# Patient Record
Sex: MALE | Race: WHITE | URBAN - NONMETROPOLITAN AREA
[De-identification: names, ages, dates, MRNs, and addresses within clinical notes are randomized per-mention and may not be internally consistent; named-entity substitution may affect disease eponyms.]

---

## 2019-09-12 ENCOUNTER — IMPORTED ENCOUNTER (OUTPATIENT)
Dept: URBAN - NONMETROPOLITAN AREA CLINIC 1 | Facility: CLINIC | Age: 49
End: 2019-09-12

## 2019-09-12 PROBLEM — H35.51: Noted: 2019-09-12

## 2019-09-12 PROBLEM — H25.043: Noted: 2019-09-12

## 2019-09-12 PROBLEM — H52.223: Noted: 2019-09-12

## 2019-09-12 PROBLEM — H52.4: Noted: 2019-09-12

## 2019-09-12 PROBLEM — H52.13: Noted: 2019-09-12

## 2019-09-12 PROCEDURE — 92004 COMPRE OPH EXAM NEW PT 1/>: CPT

## 2019-09-12 NOTE — PATIENT DISCUSSION
Cataract(s)-Visually significant.-Cataract(s) causing symptomatic impairment of visual function not correctable with a tolerable change in glasses or contact lenses lighting or non-operative means resulting in specific activity limitations and/or participation restrictions including but not limited to reading viewing television driving or meeting vocational or recreational needs. -Expectation is clearer vision and reduced glare disability after cataract removal.-Refer to Dr Kane Parra for cataract evaluationPT TO SEE [facility] REFERRALSTARHon[facility]DT'S DISEASESTABLE OU TODAY

## 2019-10-11 ENCOUNTER — IMPORTED ENCOUNTER (OUTPATIENT)
Dept: URBAN - NONMETROPOLITAN AREA CLINIC 1 | Facility: CLINIC | Age: 49
End: 2019-10-11

## 2019-10-11 PROCEDURE — 92014 COMPRE OPH EXAM EST PT 1/>: CPT

## 2019-10-11 NOTE — PATIENT DISCUSSION
Cataract(s)-Visually significant cataract OU .-Cataract(s) causing symptomatic impairment of visual function not correctable with a tolerable change in glasses or contact lenses lighting or non-operative means resulting in specific activity limitations and/or participation restrictions including but not limited to reading viewing television driving or meeting vocational or recreational needs. -Expectation is clearer vision and functional improvement in symptoms as well as reduced glare disability after cataract removal.-Order IOLMaster and OPD today. -Recommend standard/traditional based on today's OPD testing and lifestyle questionnaire.-All questions were answered regarding surgery including pre and post-op medications appointments activity restrictions and anesthetic usage.-The risks benefits and alternatives and special risk factors for the patient were discussed in detail including but not limited to: bleeding infection retinal detachment vitreous loss problems with the implant and possible need for additional surgery.-Although rare the possibility of complete vision loss was discussed.-The possible need for glasses post-operatively was discussed.-Order medical clearance exam based on history of cholesterol HTN. -Patient elects to proceed with cataract surgery OD . Will schedule at patient's convenience and re-evaluate OS  in the future. 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

## 2019-11-04 PROBLEM — H52.223: Noted: 2019-11-04

## 2019-11-04 PROBLEM — H52.13: Noted: 2019-11-04

## 2019-11-04 PROBLEM — H25.043: Noted: 2019-11-04

## 2019-11-04 PROBLEM — H35.51: Noted: 2019-11-04

## 2019-11-13 PROBLEM — I10: Noted: 2019-11-13

## 2019-11-13 PROBLEM — E78.5: Noted: 2019-11-13

## 2019-11-13 PROBLEM — H25.043: Noted: 2019-11-13

## 2019-11-13 PROBLEM — Z01.818: Noted: 2019-11-13

## 2019-11-14 ENCOUNTER — IMPORTED ENCOUNTER (OUTPATIENT)
Dept: URBAN - NONMETROPOLITAN AREA CLINIC 1 | Facility: CLINIC | Age: 49
End: 2019-11-14

## 2019-11-14 PROCEDURE — 99214 OFFICE O/P EST MOD 30 MIN: CPT

## 2019-12-12 ENCOUNTER — IMPORTED ENCOUNTER (OUTPATIENT)
Dept: URBAN - NONMETROPOLITAN AREA CLINIC 1 | Facility: CLINIC | Age: 49
End: 2019-12-12

## 2019-12-12 PROBLEM — Z98.41: Noted: 2019-12-12

## 2019-12-12 PROCEDURE — 92136 OPHTHALMIC BIOMETRY: CPT

## 2019-12-12 PROCEDURE — 66984 XCAPSL CTRC RMVL W/O ECP: CPT

## 2019-12-12 NOTE — PATIENT DISCUSSION
s/p PCIOL-Pt doing well s/p PCIOL. -Continue post-op gtts according to instruction sheet and sleep with eye shield over eye for 7 nights.-Avoid bending at the waist lifting anything over 5lbs and dirty or ariana environments. -RTC .

## 2019-12-18 ENCOUNTER — IMPORTED ENCOUNTER (OUTPATIENT)
Dept: URBAN - NONMETROPOLITAN AREA CLINIC 1 | Facility: CLINIC | Age: 49
End: 2019-12-18

## 2019-12-18 PROBLEM — Z98.41: Noted: 2019-12-12

## 2019-12-18 PROBLEM — Z01.818: Noted: 2019-12-18

## 2019-12-18 PROBLEM — H25.812: Noted: 2019-12-18

## 2019-12-18 PROBLEM — E78.5: Noted: 2019-12-18

## 2019-12-18 PROCEDURE — 99213 OFFICE O/P EST LOW 20 MIN: CPT

## 2019-12-18 PROCEDURE — 99024 POSTOP FOLLOW-UP VISIT: CPT

## 2019-12-18 NOTE — PATIENT DISCUSSION
Medical Clearance-Medical clearance done today. -No outstanding concerns that would preclude surgery.-Patient is cleared to proceed with scheduled surgery.; Dr's Notes: PCP: Dr. Gia Keating at [place]

## 2019-12-18 NOTE — PATIENT DISCUSSION
Cataract(s)-Visually significant cataract OS . -Cataract(s) causing symptomatic impairment of visual function not correctable with a tolerable change in glasses or contact lenses lighting or non-operative means resulting in specific activity limitations and/or participation restrictions including but not limited to reading viewing television driving or meeting vocational or recreational needs. -Expectation is clearer vision and functional improvement in symptoms as well as reduced glare disability after cataract removal.-Recommend Stand/Trad based on previous OPD testing and lifestyle questionnaire.-All questions were answered regarding surgery including pre and post-op medications appointments activity restrictions and anesthetic usage.-The risks benefits and alternatives and special risk factors for the patient were discussed in detail including but not limited to: bleeding infection retinal detachment vitreous loss problems with the implant and possible need for additional surgery.-Although rare the possibility of complete vision loss was discussed.-The need for glasses post-operatively was discussed.-Patient elects to proceed with cataract surgery OS . Will schedule at patient's convenience. s/p PCIOL-Pt doing well at 1 week s/p PCIOL. -Continue post-op gtts according to instruction sheet.-Okay to resume usual activites and d/c eye shield.

## 2019-12-27 ENCOUNTER — IMPORTED ENCOUNTER (OUTPATIENT)
Dept: URBAN - NONMETROPOLITAN AREA CLINIC 1 | Facility: CLINIC | Age: 49
End: 2019-12-27

## 2019-12-27 PROBLEM — Z96.1: Noted: 2019-12-27

## 2019-12-27 PROBLEM — Z98.42: Noted: 2019-12-27

## 2019-12-27 PROCEDURE — 99024 POSTOP FOLLOW-UP VISIT: CPT

## 2020-01-03 ENCOUNTER — IMPORTED ENCOUNTER (OUTPATIENT)
Dept: URBAN - NONMETROPOLITAN AREA CLINIC 1 | Facility: CLINIC | Age: 50
End: 2020-01-03

## 2020-01-03 NOTE — PATIENT DISCUSSION
"""s/p PCIOL-Pt doing well at 1 week s/p PCIOL. -Continue post-op gtts according to instruction sheet.-Okay to resume usual activites and d/c eye shield. HEAVY PCO OUPT TO F/U NEXT VISIT WITH DR Melissa Larson FOR YAG CONSULT OU; Dr's Notes: PCP: Dr. Norma Minor at [place]"

## 2020-06-26 ENCOUNTER — IMPORTED ENCOUNTER (OUTPATIENT)
Dept: URBAN - NONMETROPOLITAN AREA CLINIC 1 | Facility: CLINIC | Age: 50
End: 2020-06-26

## 2020-06-26 PROBLEM — Z96.1: Noted: 2019-12-27

## 2020-06-26 PROBLEM — H26.493: Noted: 2020-06-26

## 2020-06-26 PROCEDURE — 66821 AFTER CATARACT LASER SURGERY: CPT

## 2020-06-26 PROCEDURE — 92014 COMPRE OPH EXAM EST PT 1/>: CPT

## 2020-06-26 NOTE — PATIENT DISCUSSION
PCO-Explained PCO and RBAs of YAG Capsulotomy to pt. -Pt elects to proceed. YAG Caps OS  today and YAG Caps OD in 1-2 weeks. -Written and verbal consent signed and obtained prior to procedure.  Hx Of Retinitis Pigmentosa OU -Discussed with patient -Discussed how this has affected his vision long term and patient understands. -Continue to ciarra; 's Notes: PCP: Dr. Jaydon Dickson at [place]

## 2020-08-29 ENCOUNTER — IMPORTED ENCOUNTER (OUTPATIENT)
Dept: URBAN - NONMETROPOLITAN AREA CLINIC 1 | Facility: CLINIC | Age: 50
End: 2020-08-29

## 2020-08-29 PROBLEM — Z96.1: Noted: 2019-12-27

## 2020-08-29 PROBLEM — H26.491: Noted: 2020-08-29

## 2020-08-29 PROCEDURE — 66821 AFTER CATARACT LASER SURGERY: CPT

## 2020-09-04 ENCOUNTER — IMPORTED ENCOUNTER (OUTPATIENT)
Dept: URBAN - NONMETROPOLITAN AREA CLINIC 1 | Facility: CLINIC | Age: 50
End: 2020-09-04

## 2020-09-04 PROCEDURE — 99024 POSTOP FOLLOW-UP VISIT: CPT

## 2020-09-04 NOTE — PATIENT DISCUSSION
s/p yag pc ODSTABLE TODAYPseudophakia OS s/p YAG Caps OS- Doing well recommend observation. Hx Of Retinitis Pigmentosa OU -Discussed with patient -Discussed how this has affected his vision long term and patient understands. -Continue to quentin Steel's Notes: PCP: Dr. Stephany Roque at [place]

## 2022-04-09 ASSESSMENT — VISUAL ACUITY
OU_CC: 20/800
OS_CC: 20/200
OD_CC: 20/80-
OD_CC: 20/60+2
OS_CC: 20/400
OD_CC: 20/100
OD_CC: 20/100
OD_CC: 20/200
OD_CC: CF4'
OS_SC: 20/800
OD_SC: 20/200
OD_SC: 20/200 BLURRY
OD_PAM: 20/200
OS_CC: 20/400
OD_CC: 20/400
OS_AM: 20/800
OD_SC: 20/800
OD_CC: 20/800-1

## 2022-04-09 ASSESSMENT — TONOMETRY
OS_IOP_MMHG: 16
OS_IOP_MMHG: 16
OD_IOP_MMHG: 17
OD_IOP_MMHG: 22
OS_IOP_MMHG: 14
OD_IOP_MMHG: 17
OS_IOP_MMHG: 19
OS_IOP_MMHG: 14
OS_IOP_MMHG: 19
OS_IOP_MMHG: 15
OD_IOP_MMHG: 15
OD_IOP_MMHG: 14
OD_IOP_MMHG: 15
OD_IOP_MMHG: 18
OD_IOP_MMHG: 14
OS_IOP_MMHG: 14
OD_IOP_MMHG: 14

## 2022-05-16 NOTE — PATIENT DISCUSSION
Note Text (......Xxx Chief Complaint.): This diagnosis correlates with the s/p PCIOL-Pt doing well s/p PCIOL. -Continue post-op gtts according to instruction sheet and sleep with eye shield over eye for 7 nights.-Avoid bending at the waist lifting anything over 5lbs and dirty or ariana environments. -RTC 1 week PO.; Dr's Notes: PCP: Dr. Chasity Maya at [place] Detail Level: Zone

## 2025-05-28 NOTE — PATIENT DISCUSSION
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 Pseudophakia OS s/p YAG Caps OS- Doing well recommend observation. Hx Of Retinitis Pigmentosa OU -Discussed with patient -Discussed how this has affected his vision long term and patient understands. -Continue to quentin Steel's Notes: PCP: Dr. Vikas Granado at [place]

## 2025-07-14 ENCOUNTER — NEW PATIENT (OUTPATIENT)
Age: 55
End: 2025-07-14

## 2025-07-14 DIAGNOSIS — H35.3133: ICD-10-CM

## 2025-07-14 DIAGNOSIS — Z96.1: ICD-10-CM

## 2025-07-14 DIAGNOSIS — H04.123: ICD-10-CM

## 2025-07-14 PROCEDURE — 92004 COMPRE OPH EXAM NEW PT 1/>: CPT
